# Patient Record
Sex: FEMALE | Race: WHITE | NOT HISPANIC OR LATINO | URBAN - METROPOLITAN AREA
[De-identification: names, ages, dates, MRNs, and addresses within clinical notes are randomized per-mention and may not be internally consistent; named-entity substitution may affect disease eponyms.]

---

## 2018-09-13 PROBLEM — 40739000 DYSPHAGIA: Status: ACTIVE | Noted: 2018-09-13

## 2018-12-19 PROBLEM — 102614006 GENERALIZED ABDOMINAL PAIN: Status: ACTIVE | Noted: 2018-12-19

## 2018-12-19 PROBLEM — 271832001 FLATULENCE, ERUCTATION AND GAS PAIN: Status: ACTIVE | Noted: 2018-12-19

## 2020-08-19 ENCOUNTER — OFFICE VISIT (OUTPATIENT)
Dept: URBAN - METROPOLITAN AREA CLINIC 27 | Facility: CLINIC | Age: 85
End: 2020-08-19

## 2020-08-25 ENCOUNTER — OFFICE VISIT (OUTPATIENT)
Dept: URBAN - METROPOLITAN AREA SURGERY CENTER 7 | Facility: SURGERY CENTER | Age: 85
End: 2020-08-25

## 2020-12-21 ENCOUNTER — OFFICE VISIT (OUTPATIENT)
Dept: URBAN - METROPOLITAN AREA CLINIC 27 | Facility: CLINIC | Age: 85
End: 2020-12-21

## 2022-04-30 ENCOUNTER — TELEPHONE ENCOUNTER (OUTPATIENT)
Dept: URBAN - METROPOLITAN AREA CLINIC 121 | Facility: CLINIC | Age: 87
End: 2022-04-30

## 2022-04-30 RX ORDER — BISMUTH SUBSALICYLATE 262MG/15ML
SUSPENSION, ORAL (FINAL DOSE FORM) ORAL
OUTPATIENT
Start: 2018-08-24 | End: 2020-08-19

## 2022-04-30 RX ORDER — LORAZEPAM 0.5 MG/1
TABLET ORAL
OUTPATIENT
Start: 2018-08-24

## 2022-04-30 RX ORDER — MULTIVIT-MIN/FA/LYCOPEN/LUTEIN .4-300-25
TABLET ORAL
OUTPATIENT
Start: 2018-08-24 | End: 2020-12-21

## 2022-04-30 RX ORDER — HYDROCODONE BITARTRATE AND ACETAMINOPHEN 7.5; 325 MG/1; MG/1
TABLET ORAL
OUTPATIENT
Start: 2018-08-24

## 2022-04-30 RX ORDER — HYDROCODONE BITARTRATE AND ACETAMINOPHEN 7.5; 325 MG/1; MG/1
TABLET ORAL
OUTPATIENT
Start: 2018-08-24 | End: 2020-08-19

## 2022-04-30 RX ORDER — MULTIVIT-MIN/FA/LYCOPEN/LUTEIN .4-300-25
TABLET ORAL
OUTPATIENT
Start: 2018-08-24

## 2022-04-30 RX ORDER — BISMUTH SUBSALICYLATE 262MG/15ML
SUSPENSION, ORAL (FINAL DOSE FORM) ORAL
OUTPATIENT
Start: 2018-08-24

## 2022-04-30 RX ORDER — DIPHENOXYLATE HCL/ATROPINE 2.5-.025MG
TABLET ORAL
OUTPATIENT
Start: 2016-08-31 | End: 2020-08-19

## 2022-04-30 RX ORDER — BISMUTH SUBSALICYLATE 525 MG/1
TABLET ORAL
OUTPATIENT
Start: 2018-08-24 | End: 2020-08-19

## 2022-04-30 RX ORDER — BISMUTH SUBSALICYLATE 525 MG/1
TABLET ORAL
OUTPATIENT
Start: 2018-08-24

## 2022-04-30 RX ORDER — LORAZEPAM 0.5 MG/1
TABLET ORAL
OUTPATIENT
Start: 2018-08-24 | End: 2020-08-19

## 2022-04-30 RX ORDER — DIPHENOXYLATE HCL/ATROPINE 2.5-.025MG
TABLET ORAL
OUTPATIENT
Start: 2016-08-31

## 2022-05-01 ENCOUNTER — TELEPHONE ENCOUNTER (OUTPATIENT)
Dept: URBAN - METROPOLITAN AREA CLINIC 121 | Facility: CLINIC | Age: 87
End: 2022-05-01

## 2022-05-01 RX ORDER — SACCHAROMYCES BOULARDII 250 MG
CAPSULE ORAL
Status: ACTIVE | COMMUNITY
Start: 2020-12-21

## 2022-05-01 RX ORDER — COLESTIPOL HYDROCHLORIDE 1 G/1
TABLET ORAL
Status: ACTIVE | COMMUNITY
Start: 2020-12-21

## 2022-05-01 RX ORDER — FLUTICASONE PROPIONATE 50 UG/1
SPRAY, METERED NASAL
Status: ACTIVE | COMMUNITY
Start: 2020-12-21

## 2022-05-01 RX ORDER — CYANOCOBALAMIN (VITAMIN B-12) 100 MCG
TABLET ORAL
Status: ACTIVE | COMMUNITY
Start: 2018-08-24

## 2022-05-01 RX ORDER — TRAZODONE HYDROCHLORIDE 50 MG/1
TABLET ORAL
Status: ACTIVE | COMMUNITY
Start: 2018-08-24

## 2022-05-01 RX ORDER — ASPIRIN 81 MG/1
TABLET, DELAYED RELEASE ORAL
Status: ACTIVE | COMMUNITY
Start: 2016-08-31

## 2022-05-01 RX ORDER — OLMESARTAN MEDOXOMIL 5 MG/1
TABLET, FILM COATED ORAL
Status: ACTIVE | COMMUNITY
Start: 2020-08-19

## 2022-05-01 RX ORDER — ELECTROLYTES/DEXTROSE
SOLUTION, ORAL ORAL
Status: ACTIVE | COMMUNITY
Start: 2016-08-31

## 2022-05-01 RX ORDER — TRAMADOL HYDROCHLORIDE AND ACETAMINOPHEN 37.5; 325 MG/1; MG/1
TABLET, FILM COATED ORAL
Status: ACTIVE | COMMUNITY
Start: 2020-08-19

## 2022-05-04 ENCOUNTER — DASHBOARD ENCOUNTERS (OUTPATIENT)
Age: 87
End: 2022-05-04

## 2022-05-04 ENCOUNTER — OFFICE VISIT (OUTPATIENT)
Dept: URBAN - METROPOLITAN AREA CLINIC 27 | Facility: CLINIC | Age: 87
End: 2022-05-04
Payer: COMMERCIAL

## 2022-05-04 VITALS
BODY MASS INDEX: 23.16 KG/M2 | DIASTOLIC BLOOD PRESSURE: 81 MMHG | SYSTOLIC BLOOD PRESSURE: 133 MMHG | HEART RATE: 77 BPM | HEIGHT: 60 IN | WEIGHT: 118 LBS

## 2022-05-04 DIAGNOSIS — R19.7 DIARRHEA, UNSPECIFIED TYPE: ICD-10-CM

## 2022-05-04 PROCEDURE — 99213 OFFICE O/P EST LOW 20 MIN: CPT | Performed by: INTERNAL MEDICINE

## 2022-05-04 RX ORDER — FAMOTIDINE 20 MG/1
1 TABLET AT BEDTIME AS NEEDED TABLET, FILM COATED ORAL ONCE A DAY
Status: ACTIVE | COMMUNITY

## 2022-05-04 RX ORDER — SACCHAROMYCES BOULARDII 250 MG
CAPSULE ORAL
Status: ACTIVE | COMMUNITY
Start: 2020-12-21

## 2022-05-04 RX ORDER — ELECTROLYTES/DEXTROSE
SOLUTION, ORAL ORAL
Status: ACTIVE | COMMUNITY
Start: 2016-08-31

## 2022-05-04 RX ORDER — GABAPENTIN 300 MG/1
1 CAPSULE CAPSULE ORAL ONCE A DAY
Status: ACTIVE | COMMUNITY

## 2022-05-04 RX ORDER — CETIRIZINE HYDROCHLORIDE 10 MG/1
1 TABLET TABLET, FILM COATED ORAL ONCE A DAY
Status: ACTIVE | COMMUNITY

## 2022-05-04 RX ORDER — TRAZODONE HYDROCHLORIDE 50 MG/1
TABLET ORAL
Status: ACTIVE | COMMUNITY
Start: 2018-08-24

## 2022-05-04 RX ORDER — OLMESARTAN MEDOXOMIL 5 MG/1
TABLET, FILM COATED ORAL
Status: ACTIVE | COMMUNITY
Start: 2020-08-19

## 2022-05-04 RX ORDER — CYANOCOBALAMIN (VITAMIN B-12) 100 MCG
TABLET ORAL
Status: ACTIVE | COMMUNITY
Start: 2018-08-24

## 2022-05-04 RX ORDER — COLESTIPOL HYDROCHLORIDE 1 G/1
TABLET ORAL
Status: ACTIVE | COMMUNITY
Start: 2020-12-21

## 2022-05-04 RX ORDER — FLUTICASONE PROPIONATE 50 UG/1
SPRAY, METERED NASAL
Status: ACTIVE | COMMUNITY
Start: 2020-12-21

## 2022-05-04 RX ORDER — ESCITALOPRAM OXALATE 5 MG/1
1 TABLET TABLET, FILM COATED ORAL ONCE A DAY
Status: ACTIVE | COMMUNITY

## 2022-05-04 RX ORDER — TRAMADOL HYDROCHLORIDE AND ACETAMINOPHEN 37.5; 325 MG/1; MG/1
TABLET, FILM COATED ORAL
Status: ACTIVE | COMMUNITY
Start: 2020-08-19

## 2022-05-04 RX ORDER — SODIUM BICARBONATE TAB 650 MG 650 MG
AS DIRECTED TAB ORAL
Status: ACTIVE | COMMUNITY

## 2022-05-04 RX ORDER — ASPIRIN 81 MG/1
TABLET, DELAYED RELEASE ORAL
Status: ACTIVE | COMMUNITY
Start: 2016-08-31

## 2022-05-04 NOTE — PHYSICAL EXAM EYES:
Conjuntivae and eyelids appear normal,  Sclerae : White without injection , Conjuntivae and eyelids appear normal,  Sclerae : White without injection , Conjuntivae and eyelids appear normal,  Sclerae : White without injection

## 2022-05-04 NOTE — PHYSICAL EXAM CHEST:
no lesions,  no deformities,  no traumatic injuries,  no significant scars are present,  no masses present, breathing is unlabored without accessory muscle use,normal breath sounds , no lesions,  no deformities,  no traumatic injuries,  no significant scars are present,  no masses present, breathing is unlabored without accessory muscle use,normal breath sounds , no lesions,  no deformities,  no traumatic injuries,  no significant scars are present,  no masses present, breathing is unlabored without accessory muscle use,normal breath sounds

## 2022-05-04 NOTE — PHYSICAL EXAM NEUROLOGIC:
oriented to person, place and time , normal sensation , short and long term memory intact , oriented to person, place and time , normal sensation , short and long term memory intact , oriented to person, place and time , normal sensation , short and long term memory intact

## 2022-05-04 NOTE — HPI-TODAY'S VISIT:
This is a 89 YOF established patient presenting for evaluation of diarrhea. She has h/o chronic, intermittent diarrhea. She has some fecal incontinence. Current episode started 3 mos. ago. She is having diarrhea during the night and soiling the bed. She has 1-2 loose BMs daily. She was recently on IV abx 5 mos ago for a staph skin infection after removal of a skin cancer. Stool was negative for C. diff 2 wks ago. She is taking 2 Colestipol daily per direction from Dr. Will as well as Align. She has taken some Lomotil and Imodium prn. Taking even 1 Imodium has helped her not have accidents and have a formed BM, but she is concerned about taking Imodium b/c she once had an ileus caused by taking Imodium chronically (8/day). No hematochezia, weight loss.  Diarrhea has been lifelong, intermittent diarrhea. SHe has h/o prior partial colon resection for diverticulitis and also h/o ?small bowel resection. She is sensitive to raw and green vegetables, legumes but eats these sometimes.  EGD in 2020: muscular esophageal ring s/p dilatation and a small hiatal hernia.  Barium swallow in 2021: mild narrowing at the GE jxn but free passage of the barium tablet; small HH; large amount of reflux; and tertiary waves.  She was seen in 2019 for question of anovaginal fistula with purulent vaginal discharge; Dr. Pinon performed intraop flex sig with Dr. Correa with no abnormalities seen and no fistual identified.  She has h/o iron deficiency anemia for which she is followed by Dr. Greenfield. I have personally reviewed Dr. Greenfield's notes from Sept. 2021 indicating no further treatment for her anemia as Hgb had been stable (10-11). Labs last wk with nephrologist were apparently normal.

## 2022-05-04 NOTE — PHYSICAL EXAM CARDIOVASCULAR:
no edema,  no murmurs,  regular rate and rhythm , no edema. , no edema,  no murmurs,  regular rate and rhythm , no edema. , no edema,  no murmurs,  regular rate and rhythm , no edema.

## 2022-05-04 NOTE — PHYSICAL EXAM NECK/THYROID:
normal appearance , no deformities , trachea midline  , normal appearance , no deformities , trachea midline  , normal appearance , no deformities , trachea midline

## 2022-05-04 NOTE — PHYSICAL EXAM SKIN:
no rashes , no suspicious lesions , no areas of discoloration , no jaundice present , good turgor , no masses , no tenderness on palpation , no rashes , no suspicious lesions , no areas of discoloration , no jaundice present , good turgor , no masses , no tenderness on palpation , no rashes , no suspicious lesions , no areas of discoloration , no jaundice present , good turgor , no masses , no tenderness on palpation

## 2022-05-04 NOTE — PHYSICAL EXAM MUSCULOSKELETAL:
normal gait and station , no tenderness or deformities present , normal gait and station , no tenderness or deformities present , normal gait and station , no tenderness or deformities present

## 2022-05-04 NOTE — PHYSICAL EXAM HENT:
Head,  normocephalic,  atraumatic,  Face,  Face within normal limits,  Ears,  External ears within normal limits,  Nose/Nasopharynx,  External nose  normal appearance,  nares patent,  no nasal discharge,  Mouth and Throat,  Oral cavity appearance normal,  Breath odor normal,  Lips,  Appearance normal , Head,  normocephalic,  atraumatic,  Face,  Face within normal limits,  Ears,  External ears within normal limits,  Nose/Nasopharynx,  External nose  normal appearance,  nares patent,  no nasal discharge,  Mouth and Throat,  Oral cavity appearance normal,  Breath odor normal,  Lips,  Appearance normal , Head,  normocephalic,  atraumatic,  Face,  Face within normal limits,  Ears,  External ears within normal limits,  Nose/Nasopharynx,  External nose  normal appearance,  nares patent,  no nasal discharge,  Mouth and Throat,  Oral cavity appearance normal,  Breath odor normal,  Lips,  Appearance normal

## 2023-02-17 ENCOUNTER — OFFICE VISIT (OUTPATIENT)
Dept: URBAN - METROPOLITAN AREA CLINIC 29 | Facility: CLINIC | Age: 88
End: 2023-02-17